# Patient Record
Sex: MALE | Race: WHITE | HISPANIC OR LATINO | URBAN - METROPOLITAN AREA
[De-identification: names, ages, dates, MRNs, and addresses within clinical notes are randomized per-mention and may not be internally consistent; named-entity substitution may affect disease eponyms.]

---

## 2017-09-21 ENCOUNTER — INPATIENT (INPATIENT)
Facility: HOSPITAL | Age: 57
LOS: 0 days | Discharge: HOME | End: 2017-09-22
Attending: ORTHOPAEDIC SURGERY | Admitting: ORTHOPAEDIC SURGERY
Payer: OTHER MISCELLANEOUS

## 2017-09-21 VITALS
OXYGEN SATURATION: 96 % | WEIGHT: 214.07 LBS | HEIGHT: 68 IN | SYSTOLIC BLOOD PRESSURE: 172 MMHG | DIASTOLIC BLOOD PRESSURE: 96 MMHG | RESPIRATION RATE: 16 BRPM | HEART RATE: 70 BPM | TEMPERATURE: 98 F

## 2017-09-21 DIAGNOSIS — S01.81XA LACERATION WITHOUT FOREIGN BODY OF OTHER PART OF HEAD, INITIAL ENCOUNTER: ICD-10-CM

## 2017-09-21 LAB
ANION GAP SERPL CALC-SCNC: 8 MMOL/L — SIGNIFICANT CHANGE UP (ref 5–17)
APTT BLD: 29.2 SEC — SIGNIFICANT CHANGE UP (ref 27.5–37.4)
BUN SERPL-MCNC: 24 MG/DL — HIGH (ref 7–23)
CALCIUM SERPL-MCNC: 8.5 MG/DL — SIGNIFICANT CHANGE UP (ref 8.5–10.1)
CHLORIDE SERPL-SCNC: 106 MMOL/L — SIGNIFICANT CHANGE UP (ref 96–108)
CO2 SERPL-SCNC: 26 MMOL/L — SIGNIFICANT CHANGE UP (ref 22–31)
CREAT SERPL-MCNC: 0.97 MG/DL — SIGNIFICANT CHANGE UP (ref 0.5–1.3)
GLUCOSE SERPL-MCNC: 97 MG/DL — SIGNIFICANT CHANGE UP (ref 70–99)
HCT VFR BLD CALC: 49.8 % — SIGNIFICANT CHANGE UP (ref 39–50)
HGB BLD-MCNC: 15.3 G/DL — SIGNIFICANT CHANGE UP (ref 13–17)
INR BLD: 1.04 RATIO — SIGNIFICANT CHANGE UP (ref 0.88–1.16)
MCHC RBC-ENTMCNC: 26.1 PG — LOW (ref 27–34)
MCHC RBC-ENTMCNC: 30.7 GM/DL — LOW (ref 32–36)
MCV RBC AUTO: 84.9 FL — SIGNIFICANT CHANGE UP (ref 80–100)
PLATELET # BLD AUTO: 245 K/UL — SIGNIFICANT CHANGE UP (ref 150–400)
POTASSIUM SERPL-MCNC: 4 MMOL/L — SIGNIFICANT CHANGE UP (ref 3.5–5.3)
POTASSIUM SERPL-SCNC: 4 MMOL/L — SIGNIFICANT CHANGE UP (ref 3.5–5.3)
PROTHROM AB SERPL-ACNC: 11.4 SEC — SIGNIFICANT CHANGE UP (ref 9.8–12.7)
RBC # BLD: 5.86 M/UL — HIGH (ref 4.2–5.8)
RBC # FLD: 13.6 % — SIGNIFICANT CHANGE UP (ref 11–15)
SODIUM SERPL-SCNC: 140 MMOL/L — SIGNIFICANT CHANGE UP (ref 135–145)
WBC # BLD: 15.3 K/UL — HIGH (ref 3.8–10.5)
WBC # FLD AUTO: 15.3 K/UL — HIGH (ref 3.8–10.5)

## 2017-09-21 PROCEDURE — 99285 EMERGENCY DEPT VISIT HI MDM: CPT | Mod: 25

## 2017-09-21 PROCEDURE — 73110 X-RAY EXAM OF WRIST: CPT | Mod: 26,LT

## 2017-09-21 PROCEDURE — 71010: CPT | Mod: 26

## 2017-09-21 PROCEDURE — 70450 CT HEAD/BRAIN W/O DYE: CPT | Mod: 26

## 2017-09-21 PROCEDURE — 73110 X-RAY EXAM OF WRIST: CPT | Mod: 26,77,LT

## 2017-09-21 PROCEDURE — 73090 X-RAY EXAM OF FOREARM: CPT | Mod: 26,LT

## 2017-09-21 RX ORDER — ASCORBIC ACID 60 MG
500 TABLET,CHEWABLE ORAL
Qty: 0 | Refills: 0 | Status: DISCONTINUED | OUTPATIENT
Start: 2017-09-21 | End: 2017-09-22

## 2017-09-21 RX ORDER — CEFAZOLIN SODIUM 1 G
2000 VIAL (EA) INJECTION EVERY 8 HOURS
Qty: 0 | Refills: 0 | Status: COMPLETED | OUTPATIENT
Start: 2017-09-21 | End: 2017-09-22

## 2017-09-21 RX ORDER — FOLIC ACID 0.8 MG
1 TABLET ORAL DAILY
Qty: 0 | Refills: 0 | Status: DISCONTINUED | OUTPATIENT
Start: 2017-09-21 | End: 2017-09-22

## 2017-09-21 RX ORDER — MAGNESIUM HYDROXIDE 400 MG/1
30 TABLET, CHEWABLE ORAL DAILY
Qty: 0 | Refills: 0 | Status: DISCONTINUED | OUTPATIENT
Start: 2017-09-21 | End: 2017-09-22

## 2017-09-21 RX ORDER — FENTANYL CITRATE 50 UG/ML
50 INJECTION INTRAVENOUS
Qty: 0 | Refills: 0 | Status: DISCONTINUED | OUTPATIENT
Start: 2017-09-21 | End: 2017-09-22

## 2017-09-21 RX ORDER — FAMOTIDINE 10 MG/ML
20 INJECTION INTRAVENOUS EVERY 12 HOURS
Qty: 0 | Refills: 0 | Status: DISCONTINUED | OUTPATIENT
Start: 2017-09-21 | End: 2017-09-22

## 2017-09-21 RX ORDER — HYDRALAZINE HCL 50 MG
5 TABLET ORAL ONCE
Qty: 0 | Refills: 0 | Status: DISCONTINUED | OUTPATIENT
Start: 2017-09-21 | End: 2017-09-22

## 2017-09-21 RX ORDER — OXYCODONE HYDROCHLORIDE 5 MG/1
10 TABLET ORAL EVERY 4 HOURS
Qty: 0 | Refills: 0 | Status: DISCONTINUED | OUTPATIENT
Start: 2017-09-21 | End: 2017-09-22

## 2017-09-21 RX ORDER — ACETAMINOPHEN 500 MG
650 TABLET ORAL EVERY 6 HOURS
Qty: 0 | Refills: 0 | Status: DISCONTINUED | OUTPATIENT
Start: 2017-09-21 | End: 2017-09-22

## 2017-09-21 RX ORDER — SODIUM CHLORIDE 9 MG/ML
1000 INJECTION, SOLUTION INTRAVENOUS
Qty: 0 | Refills: 0 | Status: DISCONTINUED | OUTPATIENT
Start: 2017-09-21 | End: 2017-09-22

## 2017-09-21 RX ORDER — HYDROMORPHONE HYDROCHLORIDE 2 MG/ML
1 INJECTION INTRAMUSCULAR; INTRAVENOUS; SUBCUTANEOUS EVERY 6 HOURS
Qty: 0 | Refills: 0 | Status: DISCONTINUED | OUTPATIENT
Start: 2017-09-21 | End: 2017-09-22

## 2017-09-21 RX ORDER — SODIUM CHLORIDE 9 MG/ML
1000 INJECTION, SOLUTION INTRAVENOUS
Qty: 0 | Refills: 0 | Status: DISCONTINUED | OUTPATIENT
Start: 2017-09-21 | End: 2017-09-21

## 2017-09-21 RX ORDER — ZOLPIDEM TARTRATE 10 MG/1
5 TABLET ORAL AT BEDTIME
Qty: 0 | Refills: 0 | Status: DISCONTINUED | OUTPATIENT
Start: 2017-09-21 | End: 2017-09-22

## 2017-09-21 RX ORDER — ACETAMINOPHEN 500 MG
1000 TABLET ORAL ONCE
Qty: 0 | Refills: 0 | Status: COMPLETED | OUTPATIENT
Start: 2017-09-21 | End: 2017-09-21

## 2017-09-21 RX ORDER — HYDROMORPHONE HYDROCHLORIDE 2 MG/ML
1 INJECTION INTRAMUSCULAR; INTRAVENOUS; SUBCUTANEOUS
Qty: 0 | Refills: 0 | Status: DISCONTINUED | OUTPATIENT
Start: 2017-09-21 | End: 2017-09-21

## 2017-09-21 RX ORDER — OXYCODONE HYDROCHLORIDE 5 MG/1
5 TABLET ORAL EVERY 4 HOURS
Qty: 0 | Refills: 0 | Status: DISCONTINUED | OUTPATIENT
Start: 2017-09-21 | End: 2017-09-22

## 2017-09-21 RX ORDER — ONDANSETRON 8 MG/1
4 TABLET, FILM COATED ORAL EVERY 6 HOURS
Qty: 0 | Refills: 0 | Status: DISCONTINUED | OUTPATIENT
Start: 2017-09-21 | End: 2017-09-22

## 2017-09-21 RX ORDER — BENZOCAINE AND MENTHOL 5; 1 G/100ML; G/100ML
1 LIQUID ORAL
Qty: 0 | Refills: 0 | Status: DISCONTINUED | OUTPATIENT
Start: 2017-09-21 | End: 2017-09-22

## 2017-09-21 RX ORDER — FERROUS SULFATE 325(65) MG
325 TABLET ORAL
Qty: 0 | Refills: 0 | Status: DISCONTINUED | OUTPATIENT
Start: 2017-09-21 | End: 2017-09-22

## 2017-09-21 RX ORDER — DIPHENHYDRAMINE HCL 50 MG
50 CAPSULE ORAL EVERY 4 HOURS
Qty: 0 | Refills: 0 | Status: DISCONTINUED | OUTPATIENT
Start: 2017-09-21 | End: 2017-09-22

## 2017-09-21 RX ADMIN — Medication 1000 MILLIGRAM(S): at 20:50

## 2017-09-21 RX ADMIN — HYDROMORPHONE HYDROCHLORIDE 1 MILLIGRAM(S): 2 INJECTION INTRAMUSCULAR; INTRAVENOUS; SUBCUTANEOUS at 20:22

## 2017-09-21 RX ADMIN — Medication 400 MILLIGRAM(S): at 20:21

## 2017-09-21 RX ADMIN — SODIUM CHLORIDE 75 MILLILITER(S): 9 INJECTION, SOLUTION INTRAVENOUS at 20:22

## 2017-09-21 RX ADMIN — SODIUM CHLORIDE 75 MILLILITER(S): 9 INJECTION, SOLUTION INTRAVENOUS at 16:19

## 2017-09-21 RX ADMIN — HYDROMORPHONE HYDROCHLORIDE 1 MILLIGRAM(S): 2 INJECTION INTRAMUSCULAR; INTRAVENOUS; SUBCUTANEOUS at 20:40

## 2017-09-21 NOTE — H&P ADULT - NSHPLABSRESULTS_GEN_ALL_CORE
15.3   15.3  )-----------( 245      ( 21 Sep 2017 16:17 )             49.8       09-21    140  |  106  |  24<H>  ----------------------------<  97  4.0   |  26  |  0.97    Ca    8.5      21 Sep 2017 16:17                    PT/INR - ( 21 Sep 2017 16:17 )   PT: 11.4 sec;   INR: 1.04 ratio         PTT - ( 21 Sep 2017 16:17 )  PTT:29.2 sec    Lactate Trend            CAPILLARY BLOOD GLUCOSE

## 2017-09-21 NOTE — BRIEF OPERATIVE NOTE - PROCEDURE
<<-----Click on this checkbox to enter Procedure Open reduction and internal fixation of forearm  09/21/2017    Active  STUART

## 2017-09-21 NOTE — CONSULT NOTE ADULT - SUBJECTIVE AND OBJECTIVE BOX
56yMale presents to ED c/o severe L wrist pain s/p mechanical fall from a truck bay while at work. Has a skin abrasion on the volar aspect just beneath the palm. Also has an abrasion that required 6 stitches on his left eyebrow. Patient denies LOC. Localizing pain to distal radius. Denies radiation of pain. Pt denies numbness, tingling or burning. R Hand Dominant. Patient denies any other injuries.    PMH:  No pertinent past medical history    PSH:  No significant past surgical history    AH: denies    Meds: See med rec    T(C): 36.8 (09-21-17 @ 12:54)  HR: 70 (09-21-17 @ 12:54)  BP: 172/96 (09-21-17 @ 12:54)  RR: 16 (09-21-17 @ 12:54)  SpO2: 96% (09-21-17 @ 12:54)  Wt(kg): --    PE L UE:  1cm abrasion volar aspect on wrist, visible deformity of wrist, + soft tissue swelling, no ecchymosis; Decreased ROM of Wrist 2/2 pain. Normal Elbow/Shoulder ROM w/o pain. + TTP over DR/Ulna. + Rad Pulse 2+/4. SILT C5-T1, +AIN/PIN/Ulnar/Radial/Musc/Median, soft compartments;    R UE / B/L LE:  No bony TTP; Good ROM w/o pain. Able to SLR B/L. Exam Unremarkable.     Imaging:  XRay L Wrist  3 views of L Wrist demonstrates L distal radius fracture, intraarticular w/ posterior displacement of carpus/hand in relation to forearm. No other fx/dislocations noted. XRay of left elbow and forearm do no demonstrate any orth fractures.       Procedure Note:  After verbal consent obtained, ~ 10 cc of 1% Lidocaine injected into area around DR/Ulna as hematoma block. UE hung by fingers and reduction maneuver performed. Sugartong splint applied to Forearm/Wrist and mold held. (((((GA XR obtained which show improved alignment of L DR Fracture. Pt NVI post procedure. Pt tolerated procedure well.)))) PENDING POST REDUC XRay    A/P: 56yMale s/p Mech Fall w/ L Distal Radius Fracture  - Pain control  - Strict Ice/Elevation  - NWB L UE with splint and sling  - Keep splint clean/dry/intact;  - Encourage active finger motion to help with swelling  - Pt aware of possible need for surgical intervention of distal radius fracture. Will FU as outpatient  - Pt made aware of signs and symptoms of compartment syndrome. Aware of need to contact Doctor / Return to ED if symtoms arise.0  - All Pt's / Family Members questions answered, Pt/family understand plan.  - FU w/ Dr. Watts in 2-3 days.

## 2017-09-21 NOTE — H&P ADULT - ASSESSMENT
A/P: 56yMale s/p [facility] Fall w/ L Distal Radius Fracture  PLan for OR today  NPO   IVF while NPO   Hold all anticoagulation   FU labs  strict ice and elevation   admitted to orthopedic service   d/w attending

## 2017-09-21 NOTE — H&P ADULT - NSHPPHYSICALEXAM_GEN_ALL_CORE
PE L UE:  1cm abrasion volar aspect on wrist, visible deformity of wrist, + soft tissue swelling, no ecchymosis; Decreased ROM of Wrist 2/2 pain. Normal Elbow/Shoulder ROM w/o pain. + TTP over DR/Ulna. + Rad Pulse 2+/4. SILT C5-T1, +AIN/PIN/Ulnar/Radial/Musc/Median, soft compartments;

## 2017-09-21 NOTE — H&P ADULT - ATTENDING COMMENTS
Patient has sever pain and numbness Lt wrist and hand with displaced, comminuted, intraarticular fracture of Lt distal radius

## 2017-09-21 NOTE — ED ADULT NURSE NOTE - CHIEF COMPLAINT QUOTE
Fell off back of truck, left wrist pain, no thinners No LOC no Neck and back pain, morphine 5mg given IVP by EMS, deformity noted to left wrist, 4 feet drop off truck to head 1inch laceration to head

## 2017-09-21 NOTE — ED ADULT NURSE REASSESSMENT NOTE - NS ED NURSE REASSESS COMMENT FT1
pt admitted to or for l wrist ORIF report to or rn merlinda santos lr@75/hr infusing without difficulty r hand vss consent obtained per ortho/anesthesia

## 2017-09-21 NOTE — ED ADULT TRIAGE NOTE - CHIEF COMPLAINT QUOTE
Fell off back of truck, left wrist pain, no thinners No LOC no Neck and back pain, morphine 5mg given IVP by EMS, deformity noted to left wrist, 4 feet drop off truck Fell off back of truck, left wrist pain, no thinners No LOC no Neck and back pain, morphine 5mg given IVP by EMS, deformity noted to left wrist, 4 feet drop off truck to head 1inch laceration to head

## 2017-09-21 NOTE — H&P ADULT - HISTORY OF PRESENT ILLNESS
55 yo 56yMale presents to ED c/o severe L wrist pain s/p mechanical fall from a truck bay while at work. Has a skin abrasion on the volar aspect just beneath the palm. Also has an abrasion that required 6 stitches on his left eyebrow. Patient denies LOC. Localizing pain to distal radius. Denies radiation of pain. Pt denies numbness, tingling or burning. R Hand Dominant. Patient denies any other injuries.

## 2017-09-21 NOTE — ED ADULT NURSE NOTE - OBJECTIVE STATEMENT
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

## 2017-09-21 NOTE — ED PROVIDER NOTE - CARE PLAN
Principal Discharge DX:	Wrist fracture, closed, left, initial encounter  Secondary Diagnosis:	Forehead laceration, initial encounter

## 2017-09-22 VITALS
HEART RATE: 71 BPM | DIASTOLIC BLOOD PRESSURE: 88 MMHG | SYSTOLIC BLOOD PRESSURE: 147 MMHG | RESPIRATION RATE: 18 BRPM | TEMPERATURE: 99 F | OXYGEN SATURATION: 96 %

## 2017-09-22 LAB
ANION GAP SERPL CALC-SCNC: 7 MMOL/L — SIGNIFICANT CHANGE UP (ref 5–17)
BUN SERPL-MCNC: 18 MG/DL — SIGNIFICANT CHANGE UP (ref 7–23)
CALCIUM SERPL-MCNC: 8.1 MG/DL — LOW (ref 8.5–10.1)
CHLORIDE SERPL-SCNC: 101 MMOL/L — SIGNIFICANT CHANGE UP (ref 96–108)
CO2 SERPL-SCNC: 28 MMOL/L — SIGNIFICANT CHANGE UP (ref 22–31)
CREAT SERPL-MCNC: 0.86 MG/DL — SIGNIFICANT CHANGE UP (ref 0.5–1.3)
GLUCOSE SERPL-MCNC: 109 MG/DL — HIGH (ref 70–99)
HCT VFR BLD CALC: 46.3 % — SIGNIFICANT CHANGE UP (ref 39–50)
HGB BLD-MCNC: 14.6 G/DL — SIGNIFICANT CHANGE UP (ref 13–17)
MCHC RBC-ENTMCNC: 26.7 PG — LOW (ref 27–34)
MCHC RBC-ENTMCNC: 31.5 GM/DL — LOW (ref 32–36)
MCV RBC AUTO: 85 FL — SIGNIFICANT CHANGE UP (ref 80–100)
PLATELET # BLD AUTO: 226 K/UL — SIGNIFICANT CHANGE UP (ref 150–400)
POTASSIUM SERPL-MCNC: 3.9 MMOL/L — SIGNIFICANT CHANGE UP (ref 3.5–5.3)
POTASSIUM SERPL-SCNC: 3.9 MMOL/L — SIGNIFICANT CHANGE UP (ref 3.5–5.3)
RBC # BLD: 5.45 M/UL — SIGNIFICANT CHANGE UP (ref 4.2–5.8)
RBC # FLD: 13.3 % — SIGNIFICANT CHANGE UP (ref 11–15)
SODIUM SERPL-SCNC: 136 MMOL/L — SIGNIFICANT CHANGE UP (ref 135–145)
WBC # BLD: 11.4 K/UL — HIGH (ref 3.8–10.5)
WBC # FLD AUTO: 11.4 K/UL — HIGH (ref 3.8–10.5)

## 2017-09-22 RX ADMIN — FAMOTIDINE 20 MILLIGRAM(S): 10 INJECTION INTRAVENOUS at 05:48

## 2017-09-22 RX ADMIN — SODIUM CHLORIDE 75 MILLILITER(S): 9 INJECTION, SOLUTION INTRAVENOUS at 08:47

## 2017-09-22 RX ADMIN — Medication 100 MILLIGRAM(S): at 02:17

## 2017-09-22 RX ADMIN — HYDROMORPHONE HYDROCHLORIDE 1 MILLIGRAM(S): 2 INJECTION INTRAMUSCULAR; INTRAVENOUS; SUBCUTANEOUS at 05:47

## 2017-09-22 RX ADMIN — Medication 325 MILLIGRAM(S): at 08:46

## 2017-09-22 RX ADMIN — HYDROMORPHONE HYDROCHLORIDE 1 MILLIGRAM(S): 2 INJECTION INTRAMUSCULAR; INTRAVENOUS; SUBCUTANEOUS at 12:10

## 2017-09-22 RX ADMIN — HYDROMORPHONE HYDROCHLORIDE 1 MILLIGRAM(S): 2 INJECTION INTRAMUSCULAR; INTRAVENOUS; SUBCUTANEOUS at 11:54

## 2017-09-22 RX ADMIN — Medication 1 TABLET(S): at 11:49

## 2017-09-22 RX ADMIN — Medication 1 MILLIGRAM(S): at 11:49

## 2017-09-22 RX ADMIN — Medication 100 MILLIGRAM(S): at 10:22

## 2017-09-22 RX ADMIN — Medication 500 MILLIGRAM(S): at 05:48

## 2017-09-22 RX ADMIN — Medication 325 MILLIGRAM(S): at 11:49

## 2017-09-22 NOTE — OCCUPATIONAL THERAPY INITIAL EVALUATION ADULT - ADDITIONAL COMMENTS
Patient lives in a private house with 2 steps to enter with handrails. Once inside, the main bedroom and bathroom is on that level. The bathroom has a tub/shower combination with a regular toilet and no devices or equipment inside. The patient ambulated with no device and did drive prior to hospitalization. The patient is able to state wants and needs at this time.

## 2017-09-22 NOTE — OCCUPATIONAL THERAPY INITIAL EVALUATION ADULT - PERTINENT HX OF CURRENT PROBLEM, REHAB EVAL
Patient admitted to [facility] due to s/p fall. X-ray of L wrist on 9/21/17 revealed comminuted impacted fracture of the distal radius. Probable acute fracture of the ulnar styloid. Patient has operation done on 9/21/17 for L distal radius fracture. Patient had L ORIF placed to correct deformity.

## 2017-09-22 NOTE — DISCHARGE NOTE ADULT - HOSPITAL COURSE
The patient is a 56 year old male status post Open Reduction Surgical Fixation of a left distal radius Fracture and carpal tunnel release after being admitted through [facility] Emergency Room. The Patient was medically Optimized for the Previously mentioned surgical procedure. The patient was taken to the operating room on date mentioned above. Prophylactic antibiotics were started before the procedure and continued for 24 hours.  There were no complications during the procedure and patient tolerated the procedure well.  The patient was transferred to recovery room in stable condition and subsequently to surgical floor.  Patient was encouraged to ambulate for dvt ppx.  All home medications were continued.  The patient received physical therapy daily and daily labs were followed.  The Splint was kept clean, dry, intact and changed on POD 3.  The rest of the hospital stay was unremarkable. The patient was discharged in stable condition to follow up as outpatient.

## 2017-09-22 NOTE — DISCHARGE NOTE ADULT - PATIENT PORTAL LINK FT
“You can access the Follow[facility] Patient Portal, offered by [facility], by registering with the following website: [email or web address]”

## 2017-09-22 NOTE — PROGRESS NOTE ADULT - ASSESSMENT
56M s/p left distal radius ORIF pod1    NWB left UE  Pain control  DVT prophylaxis  Keep splint clean, dry, and intact  Rest, ice, and elevate affected extremity  Discussed signs/symptoms of compartment syndrome  Ortho stable for discharge

## 2017-09-22 NOTE — DISCHARGE NOTE ADULT - NS AS ACTIVITY OBS
No Heavy lifting/straining/Stairs allowed/Walking-Indoors allowed/Walking-Outdoors allowed/non weight bearing left upper extremity, keep splint clean dry and intact/Showering allowed

## 2017-09-22 NOTE — PHYSICAL THERAPY INITIAL EVALUATION ADULT - ADDITIONAL COMMENTS
Pt lives with his wife in a private home, 2 entry steps (+ rail), 1 level inside. Prior to admission pt was independent with all functional mobility including community ambulation without a device. Pt is right hand dominant, drives, & works as a . Pt speaks both English & [place]ish. Goal of therapy: return home.

## 2017-09-22 NOTE — OCCUPATIONAL THERAPY INITIAL EVALUATION ADULT - RANGE OF MOTION EXAMINATION, UPPER EXTREMITY
Right UE Active ROM was WFL (within functional limits)/Right UE Passive ROM was WFL  (within functional limits)/LUE AAROM shoulder Flexion WFL, LUE PROM shoulder flexion WFL, LUE AROM shoulder flexion 0-70 due to pain, LUE AROM elbow flexion 0-20 due to pain and splint, LUE PROM elbow 0-40 due to pain and splint, LUE AROM/PROM wrist flexion/extension unable due to splint, LUE DIP flexion digits 1-5 limited by more then 75% due to edema and pain

## 2017-09-22 NOTE — PROGRESS NOTE ADULT - ASSESSMENT
S/P ORIF LT distal radius fx, S/P Lt carpal tunnel release    Plan:  NWGABRIELA MENDOZA with splint  Pain management  DVT prophylaxis  Elevation Lt wrist  Passive and active RPM of fingers encouraged  May D/C home today and F/U as outpatient

## 2017-09-22 NOTE — PROGRESS NOTE ADULT - SUBJECTIVE AND OBJECTIVE BOX
Patient seen and examined. Pain controlled.    Physical exam  VS: Afebrile, vital signs stable  Gen: NAD  Left UE: Dressing clean, dry, and intact. Able to move all digits spontaneously. SILT C5-T1. +RA, capillary refill brisk. No pain with passive stretch fingers.

## 2017-09-22 NOTE — DISCHARGE NOTE ADULT - INSTRUCTIONS
no alcohol with pain medication  encourage fluids daily Discharge and follow Up Instructions Reviewed with Patient and Given

## 2017-09-22 NOTE — OCCUPATIONAL THERAPY INITIAL EVALUATION ADULT - GENERAL OBSERVATIONS, REHAB EVAL
Pt was encountered supine in bed with wife Antonette present; NAD, S/P ORIF L distal radius LUE, LUE NWB, LUE splinted, ace wrapped and in sling clean, dry and intact, AXOX4, cooperative, followed commands; pt c/o pain in L hand due to s/p LUE distal radius ORIF; +2 edema noted in RLE/LLE. Patient educated on NWB status of LUE prior to performing functional tasks. Patient verbalized understanding.

## 2017-09-22 NOTE — DISCHARGE NOTE ADULT - CARE PROVIDER_API CALL
Maik Watts (DO), Orthopaedic Surgery  [address] [place] [address]  Phone: [phone]  Fax: [phone]

## 2017-09-22 NOTE — PROGRESS NOTE ADULT - SUBJECTIVE AND OBJECTIVE BOX
Patient seen and examind  Reported decrease pain Lt wrist pain since yesterday      PE:  Dressing and splint D/C/I  Compartments soft LUE  + ROM all fingers Lt hand  NVI LUE

## 2017-09-22 NOTE — OCCUPATIONAL THERAPY INITIAL EVALUATION ADULT - MODIFIED CLINICAL TEST OF SENSORY INTEGRATION IN BALANCE TEST
Barthel Index: Feeding Score___5___, Bathing Score___0___, Grooming Score__0___, Dressing Score__0___, Bowel Score__10___, Bladder Score__10____, Toilet Score__10___, Transfer Score___15___, Mobility Score__15___, Stairs Score__10___, Total Score__75___.

## 2017-09-22 NOTE — PHYSICAL THERAPY INITIAL EVALUATION ADULT - PERTINENT HX OF CURRENT PROBLEM, REHAB EVAL
Pt is a 57yo M s/p fall off back of truck at work with resultant LUE pain. Imaging confirmed L distal radius fracture. Pt is now s/p L distal radius ORIF on 9/21. No post-op complications.

## 2017-09-22 NOTE — PHYSICAL THERAPY INITIAL EVALUATION ADULT - MODIFIED CLINICAL TEST OF SENSORY INTEGRATION IN BALANCE TEST
Barthel Index: Feeding Score _10/10__, Bathing Score _0/5__, Grooming Score _0/5__, Dressing Score _0/10__, Bowels Score __10/10_, Bladder Score _10/10__, Toilet Score _10/10__, Transfers Score _15/15__, Mobility Score __15/15_, Stairs Score _10/10__,     Total Score ___80/100

## 2017-09-22 NOTE — DISCHARGE NOTE ADULT - CARE PLAN
Principal Discharge DX:	Wrist fracture, closed, left, initial encounter  Goal:	return to baseline ADLs  Instructions for follow-up, activity and diet:	1.	Pain Control  2.	Non weight bearing left upper extremity. Encourage daily PT and Active/passive range of motion of elbow and fingers to prevent stiffness.  3.	DVT Prophylaxis encourage ambulation.  4.	PT as needed  5.	Follow up with Dr. Watts as Outpatient in 10-14 Days after Discharge from the Hospital or Rehab. Call Office For Appointment. [phone]  6.	Remove Sutures Post-Op Day 14, and Remove Dressing Post-Op Day 10, with Daily Dressing Changes as Need.  7.	Ice/Elevate affected area as Needed  8.	Keep Splint Clean and dry.

## 2017-09-22 NOTE — DISCHARGE NOTE ADULT - PLAN OF CARE
return to baseline ADLs 1.	Pain Control  2.	Non weight bearing left upper extremity. Encourage daily PT and Active/passive range of motion of elbow and fingers to prevent stiffness.  3.	DVT Prophylaxis encourage ambulation.  4.	PT as needed  5.	Follow up with Dr. Watts as Outpatient in 10-14 Days after Discharge from the Hospital or Rehab. Call Office For Appointment. [phone]  6.	Remove Sutures Post-Op Day 14, and Remove Dressing Post-Op Day 10, with Daily Dressing Changes as Need.  7.	Ice/Elevate affected area as Needed  8.	Keep Splint Clean and dry.

## 2017-09-22 NOTE — PROGRESS NOTE ADULT - SUBJECTIVE AND OBJECTIVE BOX
Patient seen and examined. Pain controlled. No acute events overnight    Vital Signs Last 24 Hrs  T(C): 37.2 (09-22-17 @ 04:05), Max: 37.6 (09-21-17 @ 21:05)  T(F): 99 (09-22-17 @ 04:05), Max: 99.6 (09-21-17 @ 21:05)  HR: 85 (09-22-17 @ 04:05) (70 - 99)  BP: 155/91 (09-22-17 @ 04:05) (142/81 - 172/96)  BP(mean): --  RR: 16 (09-22-17 @ 04:05) (12 - 19)  SpO2: 98% (09-22-17 @ 04:05) (96% - 99%)    Physical Exam  Gen: NAD  LUE:   Dressing c/d/i  +AIN/PIN/Med/Msk/Uln function  C5-T1 silt  Rad pulse intact  Compartments soft  No calf ttp    A/P: 56y Male sp ORIF Distal radius POD 1  Pain control  DVT ppx  NWB LUE  FU labs  DC home  D/w attending

## 2017-09-22 NOTE — DISCHARGE NOTE ADULT - MEDICATION SUMMARY - MEDICATIONS TO TAKE
I will START or STAY ON the medications listed below when I get home from the hospital:    oxyCODONE-acetaminophen 5 mg-325 mg oral tablet  -- 1 tab(s) by mouth every 4 hours, As Needed for pain MDD:6 pills  -- Caution federal law prohibits the transfer of this drug to any person other  than the person for whom it was prescribed.  May cause drowsiness.  Alcohol may intensify this effect.  Use care when operating dangerous machinery.  This prescription cannot be refilled.  This product contains acetaminophen.  Do not use  with any other product containing acetaminophen to prevent possible liver damage.  Using more of this medication than prescribed may cause serious breathing problems.    -- Indication: For For pain
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
## 2017-09-27 DIAGNOSIS — S52.602A UNSPECIFIED FRACTURE OF LOWER END OF LEFT ULNA, INITIAL ENCOUNTER FOR CLOSED FRACTURE: ICD-10-CM

## 2017-09-27 DIAGNOSIS — S52.572A OTHER INTRAARTICULAR FRACTURE OF LOWER END OF LEFT RADIUS, INITIAL ENCOUNTER FOR CLOSED FRACTURE: ICD-10-CM

## 2017-09-27 DIAGNOSIS — S62.92XA UNSPECIFIED FRACTURE OF LEFT WRIST AND HAND, INITIAL ENCOUNTER FOR CLOSED FRACTURE: ICD-10-CM

## 2017-09-27 DIAGNOSIS — S01.112A LACERATION WITHOUT FOREIGN BODY OF LEFT EYELID AND PERIOCULAR AREA, INITIAL ENCOUNTER: ICD-10-CM
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
## 2017-10-06 ENCOUNTER — EMERGENCY (EMERGENCY)
Facility: HOSPITAL | Age: 57
LOS: 0 days | Discharge: HOME | End: 2017-10-06
Attending: STUDENT IN AN ORGANIZED HEALTH CARE EDUCATION/TRAINING PROGRAM

## 2017-10-06 VITALS
RESPIRATION RATE: 17 BRPM | HEART RATE: 77 BPM | OXYGEN SATURATION: 98 % | TEMPERATURE: 98 F | WEIGHT: 214.07 LBS | SYSTOLIC BLOOD PRESSURE: 126 MMHG | DIASTOLIC BLOOD PRESSURE: 87 MMHG | HEIGHT: 70 IN

## 2017-10-06 NOTE — ED PROVIDER NOTE - SKIN, MLM
Skin normal color for race, warm, dry and intact. No evidence of rash, 6 sutures removed ove rthe left eyebrow, skin well healed

## 2017-10-06 NOTE — ED PROVIDER NOTE - OBJECTIVE STATEMENT
56 year old male presents today for suture removal over the left eyebrow which was placed two weeks ago, (-) Fever (-) tenderness (-) discharge

## 2017-10-07 DIAGNOSIS — Z48.02 ENCOUNTER FOR REMOVAL OF SUTURES: ICD-10-CM

## 2018-08-29 NOTE — PATIENT PROFILE ADULT. - PRESSURE ULCER(S)
[address] 
[address] [facility] [address] [id number] 
[phone] Work/School Note Date: 8/29/2018 To Whom It May concern: 
 
Yaima Valencia was seen and treated today in the emergency room by the following provider(s): 
Attending Provider: Sasha Medina MD 
Physician Assistant: Ruel Suresh PA-C. Yaima Valencia may return to work on 9/1/18.  
 
Sincerely, 
 
 
 
 
Ruel Suresh PA-C 
 
 
 

no

## 2024-04-04 NOTE — ED ADULT NURSE NOTE - NS PRO PASSIVE SMOKE EXP
VM left for son asking for a call back to discuss his fathers care. Call back number provided.     Called patient,  the phone then hung up.   
No
